# Patient Record
Sex: MALE | Race: WHITE | NOT HISPANIC OR LATINO | URBAN - METROPOLITAN AREA
[De-identification: names, ages, dates, MRNs, and addresses within clinical notes are randomized per-mention and may not be internally consistent; named-entity substitution may affect disease eponyms.]

---

## 2018-01-04 ENCOUNTER — EMERGENCY (EMERGENCY)
Facility: HOSPITAL | Age: 28
LOS: 0 days | Discharge: HOME | End: 2018-01-04

## 2018-01-04 DIAGNOSIS — Z87.891 PERSONAL HISTORY OF NICOTINE DEPENDENCE: ICD-10-CM

## 2018-01-04 DIAGNOSIS — K02.9 DENTAL CARIES, UNSPECIFIED: ICD-10-CM

## 2018-01-04 DIAGNOSIS — K08.89 OTHER SPECIFIED DISORDERS OF TEETH AND SUPPORTING STRUCTURES: ICD-10-CM

## 2022-07-04 ENCOUNTER — EMERGENCY (EMERGENCY)
Facility: HOSPITAL | Age: 32
LOS: 0 days | Discharge: HOME | End: 2022-07-04
Attending: EMERGENCY MEDICINE | Admitting: EMERGENCY MEDICINE

## 2022-07-04 VITALS
OXYGEN SATURATION: 98 % | HEART RATE: 78 BPM | TEMPERATURE: 97 F | SYSTOLIC BLOOD PRESSURE: 117 MMHG | DIASTOLIC BLOOD PRESSURE: 62 MMHG | WEIGHT: 195.11 LBS | RESPIRATION RATE: 18 BRPM

## 2022-07-04 DIAGNOSIS — T24.531A: ICD-10-CM
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
## 2022-07-04 PROCEDURE — 99283 EMERGENCY DEPT VISIT LOW MDM: CPT

## 2022-07-04 RX ORDER — TETANUS TOXOID, REDUCED DIPHTHERIA TOXOID AND ACELLULAR PERTUSSIS VACCINE, ADSORBED 5; 2.5; 8; 8; 2.5 [IU]/.5ML; [IU]/.5ML; UG/.5ML; UG/.5ML; UG/.5ML
0.5 SUSPENSION INTRAMUSCULAR ONCE
Refills: 0 | Status: COMPLETED | OUTPATIENT
Start: 2022-07-04 | End: 2022-07-04

## 2022-07-04 NOTE — ED PROVIDER NOTE - CLINICAL SUMMARY MEDICAL DECISION MAKING FREE TEXT BOX
32yM p/w burn to R calf from hot concrete, now healing well.  No concern for excoriations or secondary bacterial infection.  Recommend tdap if not up to date, PO silvadene and continued wound care, o/p f/u as indicated and return precautions.

## 2022-07-04 NOTE — ED PROVIDER NOTE - ATTENDING CONTRIBUTION TO CARE
Otherwise healthy young male p/w R foot injury - sustained burns to R calf just above his boot while at work w/ hot concrete.  Vail occurred days ago and pt has been performing home wound care but says it is pulling and hurting as it heals and he was encouraged to "get it checked out".  No fevers, bleeding or drainage.  Last tdap years ago (unclear if <5yr ago).    well appearing young male in NAD  well healing first degree burns to R calf, not circumferential, erythematous w/ granulation tissue w/o maceration or purulent drainage  no excoriations or concern for secondary bacterial infection

## 2022-07-04 NOTE — ED PROVIDER NOTE - OBJECTIVE STATEMENT
32-year-old male with no past medical history presents with right leg burn x2 days.  Patient works as a  and accidentally poured hot cement over his boot resulting in burn to lateral and medial middle calf bleeding controlled.  Patient's says that he went into a pool yesterday and noted increased irritation of the area.  Patient reports last tetanus shot was 8 years ago

## 2022-07-04 NOTE — ED PROVIDER NOTE - NSFOLLOWUPCLINICS_GEN_ALL_ED_FT
[facility]  Burn  [address] [place] [address]  Phone: [phone]  Fax:     [facility]  Burn  [address] [facility], [address]  [place] [address]  Phone: [phone]  Fax:

## 2022-07-04 NOTE — ED PROVIDER NOTE - NS ED ROS FT
Constitutional:  No fevers or chills.  Eyes:  No visual changes, eye pain, or discharge.  ENT:  No hearing changes. No sore throat.  Neck:  No neck pain or stiffness.  Cardiac:  No CP or edema.  Resp:  No cough or SOB. No hemoptysis.   GI:  No nausea, vomiting, diarrhea, or abdominal pain.  :  No dysuria, frequency, or hematuria.  MSK:  No myalgias or joint pain/swelling.  Neuro:  No headache, dizziness, or weakness.  Skin: (+) burn No skin rash.

## 2022-07-04 NOTE — ED PROVIDER NOTE - NSFOLLOWUPINSTRUCTIONS_ED_ALL_ED_FT
Wound care to be performed twice daily. OK to bathe with wound care. Wash wounds with warm, soapy water and a wash cloth.  Dress open areas to *** with *** and secure in place with ***. Monitor for signs of infection including fever, chills, redness, swelling, increased pain or foul smelling drainage. Follow-up in Burn Clinic in ***. Burn Clinic is located at [address] [address]. Please call [phone] to make an appointment. Wound care to be performed twice daily. OK to bathe with wound care. Wash wounds with warm, soapy water and a wash cloth.  Dress open areas with silvadene and cover with gauze/kerlex. Monitor for signs of infection including fever, chills, redness, swelling, increased pain or foul smelling drainage. Follow-up in Burn Clinic in 3-5 days if not improved. Burn Clinic is located at [address] [place] [address]. Please call [phone] to make an appointment.

## 2022-07-04 NOTE — ED PROVIDER NOTE - PATIENT PORTAL LINK FT
You can access the FollowMyHealth Patient Portal offered by [facility] by registering at the following website: [email or web address]. By joining [email or web address] FollowMyHealth portal, you will also be able to view your health information using other applications (apps) compatible with our system.